# Patient Record
Sex: FEMALE | Race: WHITE
[De-identification: names, ages, dates, MRNs, and addresses within clinical notes are randomized per-mention and may not be internally consistent; named-entity substitution may affect disease eponyms.]

---

## 2020-10-12 ENCOUNTER — HOSPITAL ENCOUNTER (EMERGENCY)
Dept: HOSPITAL 47 - EC | Age: 25
Discharge: HOME | End: 2020-10-12
Payer: COMMERCIAL

## 2020-10-12 VITALS
SYSTOLIC BLOOD PRESSURE: 119 MMHG | TEMPERATURE: 97.5 F | HEART RATE: 69 BPM | RESPIRATION RATE: 12 BRPM | DIASTOLIC BLOOD PRESSURE: 68 MMHG

## 2020-10-12 DIAGNOSIS — R82.71: ICD-10-CM

## 2020-10-12 DIAGNOSIS — Z3A.11: ICD-10-CM

## 2020-10-12 DIAGNOSIS — O99.341: ICD-10-CM

## 2020-10-12 DIAGNOSIS — F32.9: ICD-10-CM

## 2020-10-12 DIAGNOSIS — K80.50: ICD-10-CM

## 2020-10-12 DIAGNOSIS — O26.611: Primary | ICD-10-CM

## 2020-10-12 DIAGNOSIS — O26.891: ICD-10-CM

## 2020-10-12 DIAGNOSIS — Z79.899: ICD-10-CM

## 2020-10-12 DIAGNOSIS — F41.9: ICD-10-CM

## 2020-10-12 LAB
ALBUMIN SERPL-MCNC: 3.9 G/DL (ref 3.5–5)
ALP SERPL-CCNC: 75 U/L (ref 38–126)
ALT SERPL-CCNC: 13 U/L (ref 4–34)
ANION GAP SERPL CALC-SCNC: 3 MMOL/L
AST SERPL-CCNC: 25 U/L (ref 14–36)
BASOPHILS # BLD AUTO: 0 K/UL (ref 0–0.2)
BASOPHILS NFR BLD AUTO: 0 %
BUN SERPL-SCNC: 11 MG/DL (ref 7–17)
CALCIUM SPEC-MCNC: 9.2 MG/DL (ref 8.4–10.2)
CHLORIDE SERPL-SCNC: 106 MMOL/L (ref 98–107)
CO2 SERPL-SCNC: 26 MMOL/L (ref 22–30)
EOSINOPHIL # BLD AUTO: 0.1 K/UL (ref 0–0.7)
EOSINOPHIL NFR BLD AUTO: 1 %
ERYTHROCYTE [DISTWIDTH] IN BLOOD BY AUTOMATED COUNT: 4.38 M/UL (ref 3.8–5.4)
ERYTHROCYTE [DISTWIDTH] IN BLOOD: 12.7 % (ref 11.5–15.5)
GLUCOSE SERPL-MCNC: 91 MG/DL (ref 74–99)
HCT VFR BLD AUTO: 41.4 % (ref 34–46)
HGB BLD-MCNC: 14 GM/DL (ref 11.4–16)
LYMPHOCYTES # SPEC AUTO: 2 K/UL (ref 1–4.8)
LYMPHOCYTES NFR SPEC AUTO: 22 %
MCH RBC QN AUTO: 32 PG (ref 25–35)
MCHC RBC AUTO-ENTMCNC: 33.9 G/DL (ref 31–37)
MCV RBC AUTO: 94.4 FL (ref 80–100)
MONOCYTES # BLD AUTO: 0.5 K/UL (ref 0–1)
MONOCYTES NFR BLD AUTO: 5 %
NEUTROPHILS # BLD AUTO: 6.1 K/UL (ref 1.3–7.7)
NEUTROPHILS NFR BLD AUTO: 69 %
PH UR: 6 [PH] (ref 5–8)
PLATELET # BLD AUTO: 231 K/UL (ref 150–450)
POTASSIUM SERPL-SCNC: 4.1 MMOL/L (ref 3.5–5.1)
PROT SERPL-MCNC: 6.9 G/DL (ref 6.3–8.2)
RBC UR QL: 2 /HPF (ref 0–5)
SODIUM SERPL-SCNC: 135 MMOL/L (ref 137–145)
SP GR UR: 1.03 (ref 1–1.03)
SQUAMOUS UR QL AUTO: 9 /HPF (ref 0–4)
UROBILINOGEN UR QL STRIP: <2 MG/DL (ref ?–2)
WBC # BLD AUTO: 8.8 K/UL (ref 3.8–10.6)
WBC # UR AUTO: 16 /HPF (ref 0–5)

## 2020-10-12 PROCEDURE — 87086 URINE CULTURE/COLONY COUNT: CPT

## 2020-10-12 PROCEDURE — 76705 ECHO EXAM OF ABDOMEN: CPT

## 2020-10-12 PROCEDURE — 96374 THER/PROPH/DIAG INJ IV PUSH: CPT

## 2020-10-12 PROCEDURE — 36415 COLL VENOUS BLD VENIPUNCTURE: CPT

## 2020-10-12 PROCEDURE — 99284 EMERGENCY DEPT VISIT MOD MDM: CPT

## 2020-10-12 PROCEDURE — 80053 COMPREHEN METABOLIC PANEL: CPT

## 2020-10-12 PROCEDURE — 85025 COMPLETE CBC W/AUTO DIFF WBC: CPT

## 2020-10-12 PROCEDURE — 83690 ASSAY OF LIPASE: CPT

## 2020-10-12 PROCEDURE — 96361 HYDRATE IV INFUSION ADD-ON: CPT

## 2020-10-12 PROCEDURE — 81001 URINALYSIS AUTO W/SCOPE: CPT

## 2020-10-12 NOTE — ED
Abdominal Pain HPI





- General


Chief Complaint: Abdominal Pain


Stated Complaint: Abd Pain - 11 wks pregnant


Time Seen by Provider: 10/12/20 12:27


Source: patient, RN notes reviewed


Mode of arrival: ambulatory


Limitations: no limitations





- History of Present Illness


Initial Comments: 





This is a 25-year-old female presents emergency Department chief complaint of 

right upper quadrant abdominal pain.  She's been having symptoms on and off for 

last 2 weeks.  Patient has noticed recently that she's had some increasing 

nausea and pain when she eats.  Patient states that she was concerned about her 

gallbladder as somebody warned her about the symptoms.  She denies any fevers or

chills no chest pain or shortness breath denies any vaginal bleeding vaginal di

scharge.  Patient is  A0 currently 11 weeks pregnant with no comp patients 

during her pregnancy.  Patient does take B6 and Unisom for her morning sickness.

 Patient is also currently taking Celexa and prenatal vitamin.





- Related Data


                                Home Medications











 Medication  Instructions  Recorded  Confirmed


 


Citalopram Hydrobromide [CeleXA] 20 mg PO HS 10/12/20 10/12/20


 


Prenatal Vit-Iron-Folic Acid 1 cap PO DAILY 10/12/20 10/12/20





[Prenatal-U Capsule (formulary)]   











                                    Allergies











Allergy/AdvReac Type Severity Reaction Status Date / Time


 


No Known Allergies Allergy   Verified 10/12/20 13:40














Review of Systems


ROS Statement: 


Those systems with pertinent positive or pertinent negative responses have been 

documented in the HPI.





ROS Other: All systems not noted in ROS Statement are negative.





Past Medical History


Past Medical History: No Reported History


History of Any Multi-Drug Resistant Organisms: None Reported


Past Surgical History: No Surgical Hx Reported


Past Psychological History: Anxiety, Depression


Smoking Status: Never smoker


Past Alcohol Use History: None Reported


Past Drug Use History: None Reported





General Exam


General appearance: alert, in no apparent distress


Head exam: Present: atraumatic, normocephalic, normal inspection


Neck exam: Present: normal inspection, full ROM.  Absent: tenderness, 

meningismus, lymphadenopathy


Respiratory exam: Present: normal lung sounds bilaterally.  Absent: respiratory 

distress, wheezes, rales, rhonchi, stridor


Cardiovascular Exam: Present: regular rate, normal rhythm, normal heart sounds. 

Absent: systolic murmur, diastolic murmur, rubs, gallop, clicks


GI/Abdominal exam: Present: soft, tenderness (Mild right upper quadrant), normal

bowel sounds.  Absent: distended, guarding, rebound, rigid


Back exam: Absent: CVA tenderness (R), CVA tenderness (L)


Neurological exam: Present: alert


Skin exam: Present: warm, dry, intact, normal color.  Absent: rash





Course


                                   Vital Signs











  10/12/20





  12:20


 


Temperature 99.3 F


 


Pulse Rate 96


 


Respiratory 18





Rate 


 


Blood Pressure 119/67


 


O2 Sat by Pulse 100





Oximetry 














Medical Decision Making





- Medical Decision Making





REVIEWED NO ACUTE ABNORMALITY LABS REVIEWEDAND ABNORMALITY .  Patient's 

urinalysis revealed shows patient denied bacteria.  Patient given Rocephin, 

3days of Keflex.  Patient follow-up with OB/GYN Friday return parameters were 

discussed.





- Lab Data


Result diagrams: 


                                 10/12/20 12:37





                                 10/12/20 12:37


                                   Lab Results











  10/12/20 10/12/20 10/12/20 Range/Units





  12:37 12:37 12:37 


 


WBC  8.8    (3.8-10.6)  k/uL


 


RBC  4.38    (3.80-5.40)  m/uL


 


Hgb  14.0    (11.4-16.0)  gm/dL


 


Hct  41.4    (34.0-46.0)  %


 


MCV  94.4    (80.0-100.0)  fL


 


MCH  32.0    (25.0-35.0)  pg


 


MCHC  33.9    (31.0-37.0)  g/dL


 


RDW  12.7    (11.5-15.5)  %


 


Plt Count  231    (150-450)  k/uL


 


Neutrophils %  69    %


 


Lymphocytes %  22    %


 


Monocytes %  5    %


 


Eosinophils %  1    %


 


Basophils %  0    %


 


Neutrophils #  6.1    (1.3-7.7)  k/uL


 


Lymphocytes #  2.0    (1.0-4.8)  k/uL


 


Monocytes #  0.5    (0-1.0)  k/uL


 


Eosinophils #  0.1    (0-0.7)  k/uL


 


Basophils #  0.0    (0-0.2)  k/uL


 


Sodium    135 L  (137-145)  mmol/L


 


Potassium    4.1  (3.5-5.1)  mmol/L


 


Chloride    106  ()  mmol/L


 


Carbon Dioxide    26  (22-30)  mmol/L


 


Anion Gap    3  mmol/L


 


BUN    11  (7-17)  mg/dL


 


Creatinine    0.53  (0.52-1.04)  mg/dL


 


Est GFR (CKD-EPI)AfAm    >90  (>60 ml/min/1.73 sqM)  


 


Est GFR (CKD-EPI)NonAf    >90  (>60 ml/min/1.73 sqM)  


 


Glucose    91  (74-99)  mg/dL


 


Calcium    9.2  (8.4-10.2)  mg/dL


 


Total Bilirubin    0.4  (0.2-1.3)  mg/dL


 


AST    25  (14-36)  U/L


 


ALT    13  (4-34)  U/L


 


Alkaline Phosphatase    75  ()  U/L


 


Total Protein    6.9  (6.3-8.2)  g/dL


 


Albumin    3.9  (3.5-5.0)  g/dL


 


Lipase    65  ()  U/L


 


Urine Color   Yellow   


 


Urine Appearance   Cloudy H   (Clear)  


 


Urine pH   6.0   (5.0-8.0)  


 


Ur Specific Gravity   1.032   (1.001-1.035)  


 


Urine Protein   Trace H   (Negative)  


 


Urine Glucose (UA)   Negative   (Negative)  


 


Urine Ketones   Negative   (Negative)  


 


Urine Blood   Negative   (Negative)  


 


Urine Nitrite   Negative   (Negative)  


 


Urine Bilirubin   Negative   (Negative)  


 


Urine Urobilinogen   <2.0   (<2.0)  mg/dL


 


Ur Leukocyte Esterase   Large H   (Negative)  


 


Urine RBC   2   (0-5)  /hpf


 


Urine WBC   16 H   (0-5)  /hpf


 


Ur Squamous Epith Cells   9 H   (0-4)  /hpf


 


Urine Bacteria   Rare H   (None)  /hpf


 


Urine Mucus   Few H   (None)  /hpf














Disposition


Clinical Impression: 


 Biliary colic, Abdominal pain, Asymptomatic bacteriuria during pregnancy





Disposition: HOME SELF-CARE


Condition: Stable


Instructions (If sedation given, give patient instructions):  Low Fat Diet (ED),

Biliary Colic (ED)


Additional Instructions: 


Please return to the Emergency Department if symptoms worsen or any other 

concerns.


Is patient prescribed a controlled substance at d/c from ED?: No


Referrals: 


Nonstaff,Physician [Primary Care Provider] - 1-2 days


Time of Disposition: 14:26

## 2020-10-12 NOTE — US
EXAMINATION TYPE: US gallbladder

 

DATE OF EXAM: 10/12/2020

 

COMPARISON: NONE

 

CLINICAL HISTORY: 25-year-old female RUQ pain, nausea x 1 1/2 weeks; pt 11 weeks pregnant

 

TECHNIQUE: Multiple sonographic images of the right upper quadrant are obtained.

 

FINDINGS:

 

EXAM MEASUREMENTS:

 

Liver Length:  14.0 cm   

Gallbladder Wall:  0.2 cm   

CBD:  0.3 cm

Right Kidney:  11.9x 4.2 x 5.0  cm

 

 

 

Pancreas:  Tail obscured by overlying bowel gas

Liver:  wnl  

Gallbladder:  No stones seen

**Evidence for sonographic Ivey's sign:  No

CBD:  wnl 

Right Kidney:  No hydronephrosis.

 

 

 

IMPRESSION: 

Suboptimal visualization of the pancreatic tail. Otherwise, unremarkable sonographic examination of t
he right upper quadrant.